# Patient Record
Sex: FEMALE | Race: WHITE | NOT HISPANIC OR LATINO | Employment: FULL TIME | URBAN - METROPOLITAN AREA
[De-identification: names, ages, dates, MRNs, and addresses within clinical notes are randomized per-mention and may not be internally consistent; named-entity substitution may affect disease eponyms.]

---

## 2017-01-04 ENCOUNTER — TRANSCRIBE ORDERS (OUTPATIENT)
Dept: ADMINISTRATIVE | Facility: HOSPITAL | Age: 52
End: 2017-01-04

## 2017-01-04 DIAGNOSIS — E04.1 THYROID NODULE: Primary | ICD-10-CM

## 2017-01-10 ENCOUNTER — HOSPITAL ENCOUNTER (OUTPATIENT)
Dept: ULTRASOUND IMAGING | Facility: CLINIC | Age: 52
Discharge: HOME | End: 2017-01-10
Payer: COMMERCIAL

## 2017-01-10 DIAGNOSIS — E04.1 THYROID NODULE: ICD-10-CM

## 2017-01-10 PROCEDURE — 76536 US EXAM OF HEAD AND NECK: CPT

## 2017-11-10 ENCOUNTER — TRANSCRIBE ORDERS (OUTPATIENT)
Dept: ADMINISTRATIVE | Facility: HOSPITAL | Age: 52
End: 2017-11-10

## 2017-11-10 ENCOUNTER — APPOINTMENT (OUTPATIENT)
Dept: RADIOLOGY | Facility: CLINIC | Age: 52
End: 2017-11-10
Payer: COMMERCIAL

## 2017-11-10 DIAGNOSIS — M25.512 PAIN IN JOINT OF LEFT SHOULDER: ICD-10-CM

## 2017-11-10 DIAGNOSIS — M25.512 PAIN IN JOINT OF LEFT SHOULDER: Primary | ICD-10-CM

## 2017-11-10 PROCEDURE — 73030 X-RAY EXAM OF SHOULDER: CPT

## 2018-07-09 ENCOUNTER — TELEPHONE (OUTPATIENT)
Dept: ENDOCRINOLOGY | Facility: HOSPITAL | Age: 53
End: 2018-07-09

## 2018-07-09 NOTE — TELEPHONE ENCOUNTER
Left message for patient  She had called requesting a refill of synthroid 200mcg, we need to clarify directions and pharmacy

## 2018-07-17 DIAGNOSIS — E03.9 HYPOTHYROIDISM, UNSPECIFIED TYPE: Primary | ICD-10-CM

## 2018-07-17 RX ORDER — LEVOTHYROXINE SODIUM 0.2 MG/1
200 TABLET ORAL DAILY
Qty: 31 TABLET | Refills: 5 | Status: SHIPPED | OUTPATIENT
Start: 2018-07-17 | End: 2018-12-21 | Stop reason: SDUPTHER

## 2018-07-17 NOTE — TELEPHONE ENCOUNTER
Patient would like a new lab slip to check her hypothyroidism  She said it has been over a year  pls call when slip is done

## 2018-08-11 LAB
T4 FREE SERPL-MCNC: 1.1 NG/DL (ref 0.8–1.8)
TSH SERPL-ACNC: 0.72 MIU/L

## 2018-12-21 ENCOUNTER — OFFICE VISIT (OUTPATIENT)
Dept: ENDOCRINOLOGY | Facility: HOSPITAL | Age: 53
End: 2018-12-21
Payer: COMMERCIAL

## 2018-12-21 VITALS
WEIGHT: 251.2 LBS | HEART RATE: 68 BPM | SYSTOLIC BLOOD PRESSURE: 114 MMHG | DIASTOLIC BLOOD PRESSURE: 82 MMHG | BODY MASS INDEX: 39.43 KG/M2 | HEIGHT: 67 IN

## 2018-12-21 DIAGNOSIS — E06.3 HYPOTHYROIDISM DUE TO HASHIMOTO'S THYROIDITIS: Primary | ICD-10-CM

## 2018-12-21 DIAGNOSIS — E03.8 HYPOTHYROIDISM DUE TO HASHIMOTO'S THYROIDITIS: Primary | ICD-10-CM

## 2018-12-21 DIAGNOSIS — E03.9 HYPOTHYROIDISM, UNSPECIFIED TYPE: ICD-10-CM

## 2018-12-21 PROCEDURE — 99204 OFFICE O/P NEW MOD 45 MIN: CPT | Performed by: INTERNAL MEDICINE

## 2018-12-21 RX ORDER — ATENOLOL 25 MG/1
25 TABLET ORAL DAILY
Refills: 3 | COMMUNITY
Start: 2018-10-17

## 2018-12-21 RX ORDER — PRAVASTATIN SODIUM 40 MG
40 TABLET ORAL DAILY
Refills: 0 | COMMUNITY
Start: 2018-12-12

## 2018-12-21 RX ORDER — VENLAFAXINE HYDROCHLORIDE 150 MG/1
150 CAPSULE, EXTENDED RELEASE ORAL DAILY
Refills: 0 | COMMUNITY
Start: 2018-11-21

## 2018-12-21 RX ORDER — LEVOTHYROXINE SODIUM 0.2 MG/1
200 TABLET ORAL DAILY
Qty: 30 TABLET | Refills: 7 | Status: SHIPPED | OUTPATIENT
Start: 2018-12-21 | End: 2019-08-23

## 2018-12-21 RX ORDER — HYDROCHLOROTHIAZIDE 25 MG/1
25 TABLET ORAL DAILY
Refills: 3 | COMMUNITY
Start: 2018-10-03

## 2018-12-21 RX ORDER — ERGOCALCIFEROL 1.25 MG/1
50000 CAPSULE ORAL WEEKLY
Refills: 3 | COMMUNITY
Start: 2018-10-16

## 2018-12-21 NOTE — LETTER
December 21, 2018     Nolberto Matias MD  [address] [place] [address]    Patient: Cindy Lawson   YOB: 1965   Date of Visit: 12/21/2018       Dear Dr Erika Fuller:    Thank you for referring Joi Cowart to me for evaluation  Below are my notes for this consultation  If you have questions, please do not hesitate to call me  I look forward to following your patient along with you  Sincerely,        Marlene Horta DO        CC: No Recipients  Marlene Horta DO  12/21/2018  2:17 PM  Sign at close encounter  12/21/2018    Assessment/Plan      Diagnoses and all orders for this visit:    Hypothyroidism due to Hashimoto's thyroiditis  -     TSH, 3rd generation; Future  -     T4, free Lab Collect; Future  -     US thyroid; Future    Hypothyroidism, unspecified type  -     levothyroxine (SYNTHROID) 200 mcg tablet; Take 1 tablet (200 mcg total) by mouth daily 1 tab daily  0 5 tabs 2 days of the month  BRAND NECESSARY  -     TSH, 3rd generation; Future  -     T4, free Lab Collect; Future  -     US thyroid; Future    Other orders  -     venlafaxine (EFFEXOR-XR) 150 mg 24 hr capsule; Take 150 mg by mouth daily  -     pravastatin (PRAVACHOL) 40 mg tablet; Take 40 mg by mouth daily  -     hydrochlorothiazide (HYDRODIURIL) 25 mg tablet; Take 25 mg by mouth daily  -     ergocalciferol (VITAMIN D2) 50,000 units; Take 50,000 Units by mouth once a week  -     atenolol (TENORMIN) 25 mg tablet; Take 25 mg by mouth daily        Assessment/Plan:  57-year-old female presents to establish care for hypothyroidism due to Hashimoto's thyroiditis  Clinically and biochemically she is doing well on current Synthroid brand 200 mcg tablet dose of 1 tablet daily except on the 1st and the 15th of the month when she takes only half tablet  I would suggest we continue this and see her back in August which will be 1 year from her previous blood work  She over TSH and free T4 before that appointment    I have asked her to have a repeat thyroid ultrasound done  In the past there was a possible nodular area though it was felt that this is more related to her Hashimoto's contour of the gland  We will touch base at her appointment regarding the results of the ultrasound  CC:   Hypothyroidism    History of Present Illness     HPI: Concetta Maloney is a 52y o  year old female with history of hypothyroidism due to Hashimoto's thyroiditis, hypertension, hyperlipidemia, vitamin-D deficiency who presents to [facility] care  Previously she followed doctor Sun  She presents today feeling well without any symptoms of concern  She denies any concerns regarding fatigue, temperature intolerance  She does report some dry skin but relates this is more to the time of the year  She denies any neck compressive symptoms  She denies any siblings or parents with thyroid problems  She does state her son has a history of a thyroid problem 2  She takes her Synthroid brand 200 mcg tablet dose appropriately  She takes 1 tablet daily except on the 1st in the 15th of the month she will take only half tablet  No other new health issues or symptoms of concern expressed today  Review of Systems   Constitutional: Negative for fatigue  HENT: Negative for trouble swallowing and voice change  Eyes: Negative for visual disturbance  Respiratory: Negative for shortness of breath  Cardiovascular: Negative for palpitations and leg swelling  Gastrointestinal: Negative for abdominal pain, nausea and vomiting  Endocrine: Negative for cold intolerance, heat intolerance, polydipsia and polyuria  Musculoskeletal: Negative for arthralgias and myalgias  Skin: Negative for rash  Neurological: Negative for dizziness, tremors and weakness  Hematological: Negative for adenopathy  Psychiatric/Behavioral: Negative for agitation and confusion  Historical Information   History reviewed   No pertinent past medical history  History reviewed  No pertinent surgical history  Social History   History   Alcohol use Not on file     History   Drug use: Unknown     History   Smoking Status    Never Smoker   Smokeless Tobacco    Never Used     Family History:   Family History   Problem Relation Age of Onset    Cancer Mother     No Known Problems Father     Diabetes unspecified Sister     Diabetes unspecified Brother     Diabetes unspecified Family        Meds/Allergies   Current Outpatient Prescriptions   Medication Sig Dispense Refill    atenolol (TENORMIN) 25 mg tablet Take 25 mg by mouth daily  3    ergocalciferol (VITAMIN D2) 50,000 units Take 50,000 Units by mouth once a week  3    hydrochlorothiazide (HYDRODIURIL) 25 mg tablet Take 25 mg by mouth daily  3    levothyroxine (SYNTHROID) 200 mcg tablet Take 1 tablet (200 mcg total) by mouth daily 1 tab daily  0 5 tabs 2 days of the month  BRAND NECESSARY  30 tablet 7    pravastatin (PRAVACHOL) 40 mg tablet Take 40 mg by mouth daily  0    venlafaxine (EFFEXOR-XR) 150 mg 24 hr capsule Take 150 mg by mouth daily  0     No current facility-administered medications for this visit  No Known Allergies    Objective   Vitals: Blood pressure 114/82, pulse 68, height 5' 7" (1 702 m), weight 114 kg (251 lb 3 2 oz)  Invasive Devices          No matching active lines, drains, or airways          Physical Exam   Constitutional: She is oriented to person, place, and time  She appears well-developed and well-nourished  No distress  HENT:   Head: Normocephalic and atraumatic  Eyes: Pupils are equal, round, and reactive to light  Conjunctivae are normal    Neck: Normal range of motion  Neck supple  No thyromegaly present  Cardiovascular: Normal rate and regular rhythm  Pulmonary/Chest: Effort normal and breath sounds normal  No respiratory distress  She has no wheezes  Abdominal: Soft  Bowel sounds are normal  She exhibits no distension     Musculoskeletal: Normal range of motion  She exhibits no edema  Lymphadenopathy:     She has no cervical adenopathy  Neurological: She is alert and oriented to person, place, and time  She exhibits normal muscle tone  Skin: Skin is warm and dry  No rash noted  She is not diaphoretic  Psychiatric: She has a normal mood and affect  Her behavior is normal    Vitals reviewed  The history was obtained from the review of the chart and from the patient  Lab Results:      Recent Results (from the past 03147 hour(s))   TSH+Free T4    Collection Time: 08/10/18  1:45 PM   Result Value Ref Range    TSH 0 72 mIU/L    Free t4 1 1 0 8 - 1 8 ng/dL     01/10/2017:  THYROID ULTRASOUND     INDICATION: Thyroid nodule      COMPARISON: Ultrasound 4/26/2011     TECHNIQUE:   Ultrasound of the thyroid was performed with a high frequency linear transducer in transverse and sagittal planes including volumetric imaging sweeps as well as traditional still imaging technique      FINDINGS:  Thyroid parenchyma is diffusely heterogeneous in echotexture      Right gland:  4 0 x 1 2 x 1 4 cm       1 4 x 0 6 x 0 5 cm hypoechoic ovoid nodular area seen in the anterior midpole without significant internal vascularity  Unclear if this represents heterogeneous parenchyma versus a true nodule      1 0 x 0 6 x 0 8 cm anterior lower pole hypoechoic nodular focus with some internal vascularity, may also represent heterogeneous parenchyma versus true nodule  This appears more discrete than the prior study however      Left gland:  4 4 x 1 8 x 1 2 cm  No dominant nodules      Isthmus: The isthmus is 0 2 cm in AP dimension      IMPRESSION:     Diffusely heterogeneous thyroid  Hypoechoic right-sided nodular foci may represent heterogeneous parenchyma versus true nodules  Six-month follow-up ultrasound recommended  No future appointments  Portions of the record may have been created with voice recognition software   Occasional wrong word or "sound a like" substitutions may have occurred due to the inherent limitations of voice recognition software  Read the chart carefully and recognize, using context, where substitutions have occurred

## 2018-12-21 NOTE — PROGRESS NOTES
12/21/2018    Assessment/Plan      Diagnoses and all orders for this visit:    Hypothyroidism due to Hashimoto's thyroiditis  -     TSH, 3rd generation; Future  -     T4, free Lab Collect; Future  -     US thyroid; Future    Hypothyroidism, unspecified type  -     levothyroxine (SYNTHROID) 200 mcg tablet; Take 1 tablet (200 mcg total) by mouth daily 1 tab daily  0 5 tabs 2 days of the month  BRAND NECESSARY  -     TSH, 3rd generation; Future  -     T4, free Lab Collect; Future  -     US thyroid; Future    Other orders  -     venlafaxine (EFFEXOR-XR) 150 mg 24 hr capsule; Take 150 mg by mouth daily  -     pravastatin (PRAVACHOL) 40 mg tablet; Take 40 mg by mouth daily  -     hydrochlorothiazide (HYDRODIURIL) 25 mg tablet; Take 25 mg by mouth daily  -     ergocalciferol (VITAMIN D2) 50,000 units; Take 50,000 Units by mouth once a week  -     atenolol (TENORMIN) 25 mg tablet; Take 25 mg by mouth daily        Assessment/Plan:  68-year-old female presents to establish care for hypothyroidism due to Hashimoto's thyroiditis  Clinically and biochemically she is doing well on current Synthroid brand 200 mcg tablet dose of 1 tablet daily except on the 1st and the 15th of the month when she takes only half tablet  I would suggest we continue this and see her back in August which will be 1 year from her previous blood work  She over TSH and free T4 before that appointment  I have asked her to have a repeat thyroid ultrasound done  In the past there was a possible nodular area though it was felt that this is more related to her Hashimoto's contour of the gland  We will touch base at her appointment regarding the results of the ultrasound  CC:   Hypothyroidism    History of Present Illness     HPI: Bert Alvarez is a 52y o  year old female with history of hypothyroidism due to Hashimoto's thyroiditis, hypertension, hyperlipidemia, vitamin-D deficiency who presents to establish care    Previously she followed doctor Sun  She presents today feeling well without any symptoms of concern  She denies any concerns regarding fatigue, temperature intolerance  She does report some dry skin but relates this is more to the time of the year  She denies any neck compressive symptoms  She denies any siblings or parents with thyroid problems  She does state her son has a history of a thyroid problem 2  She takes her Synthroid brand 200 mcg tablet dose appropriately  She takes 1 tablet daily except on the 1st in the 15th of the month she will take only half tablet  No other new health issues or symptoms of concern expressed today  Review of Systems   Constitutional: Negative for fatigue  HENT: Negative for trouble swallowing and voice change  Eyes: Negative for visual disturbance  Respiratory: Negative for shortness of breath  Cardiovascular: Negative for palpitations and leg swelling  Gastrointestinal: Negative for abdominal pain, nausea and vomiting  Endocrine: Negative for cold intolerance, heat intolerance, polydipsia and polyuria  Musculoskeletal: Negative for arthralgias and myalgias  Skin: Negative for rash  Neurological: Negative for dizziness, tremors and weakness  Hematological: Negative for adenopathy  Psychiatric/Behavioral: Negative for agitation and confusion  Historical Information   History reviewed  No pertinent past medical history  History reviewed  No pertinent surgical history    Social History   History   Alcohol use Not on file     History   Drug use: Unknown     History   Smoking Status    Never Smoker   Smokeless Tobacco    Never Used     Family History:   Family History   Problem Relation Age of Onset    Cancer Mother     No Known Problems Father     Diabetes unspecified Sister     Diabetes unspecified Brother     Diabetes unspecified Family        Meds/Allergies   Current Outpatient Prescriptions   Medication Sig Dispense Refill    atenolol (TENORMIN) 25 mg tablet Take 25 mg by mouth daily  3    ergocalciferol (VITAMIN D2) 50,000 units Take 50,000 Units by mouth once a week  3    hydrochlorothiazide (HYDRODIURIL) 25 mg tablet Take 25 mg by mouth daily  3    levothyroxine (SYNTHROID) 200 mcg tablet Take 1 tablet (200 mcg total) by mouth daily 1 tab daily  0 5 tabs 2 days of the month  BRAND NECESSARY  30 tablet 7    pravastatin (PRAVACHOL) 40 mg tablet Take 40 mg by mouth daily  0    venlafaxine (EFFEXOR-XR) 150 mg 24 hr capsule Take 150 mg by mouth daily  0     No current facility-administered medications for this visit  No Known Allergies    Objective   Vitals: Blood pressure 114/82, pulse 68, height 5' 7" (1 702 m), weight 114 kg (251 lb 3 2 oz)  Invasive Devices          No matching active lines, drains, or airways          Physical Exam   Constitutional: She is oriented to person, place, and time  She appears well-developed and well-nourished  No distress  HENT:   Head: Normocephalic and atraumatic  Eyes: Pupils are equal, round, and reactive to light  Conjunctivae are normal    Neck: Normal range of motion  Neck supple  No thyromegaly present  Cardiovascular: Normal rate and regular rhythm  Pulmonary/Chest: Effort normal and breath sounds normal  No respiratory distress  She has no wheezes  Abdominal: Soft  Bowel sounds are normal  She exhibits no distension  Musculoskeletal: Normal range of motion  She exhibits no edema  Lymphadenopathy:     She has no cervical adenopathy  Neurological: She is alert and oriented to person, place, and time  She exhibits normal muscle tone  Skin: Skin is warm and dry  No rash noted  She is not diaphoretic  Psychiatric: She has a normal mood and affect  Her behavior is normal    Vitals reviewed  The history was obtained from the review of the chart and from the patient      Lab Results:      Recent Results (from the past 09354 hour(s))   TSH+Free T4    Collection Time: 08/10/18  1:45 PM   Result Value Ref Range    TSH 0 72 mIU/L    Free t4 1 1 0 8 - 1 8 ng/dL     01/10/2017:  THYROID ULTRASOUND     INDICATION: Thyroid nodule      COMPARISON: Ultrasound 4/26/2011     TECHNIQUE:   Ultrasound of the thyroid was performed with a high frequency linear transducer in transverse and sagittal planes including volumetric imaging sweeps as well as traditional still imaging technique      FINDINGS:  Thyroid parenchyma is diffusely heterogeneous in echotexture      Right gland:  4 0 x 1 2 x 1 4 cm       1 4 x 0 6 x 0 5 cm hypoechoic ovoid nodular area seen in the anterior midpole without significant internal vascularity  Unclear if this represents heterogeneous parenchyma versus a true nodule      1 0 x 0 6 x 0 8 cm anterior lower pole hypoechoic nodular focus with some internal vascularity, may also represent heterogeneous parenchyma versus true nodule  This appears more discrete than the prior study however      Left gland:  4 4 x 1 8 x 1 2 cm  No dominant nodules      Isthmus: The isthmus is 0 2 cm in AP dimension      IMPRESSION:     Diffusely heterogeneous thyroid  Hypoechoic right-sided nodular foci may represent heterogeneous parenchyma versus true nodules  Six-month follow-up ultrasound recommended  No future appointments  Portions of the record may have been created with voice recognition software  Occasional wrong word or "sound a like" substitutions may have occurred due to the inherent limitations of voice recognition software  Read the chart carefully and recognize, using context, where substitutions have occurred

## 2019-01-14 ENCOUNTER — APPOINTMENT (OUTPATIENT)
Dept: RADIOLOGY | Facility: CLINIC | Age: 54
End: 2019-01-14
Payer: COMMERCIAL

## 2019-01-14 ENCOUNTER — TRANSCRIBE ORDERS (OUTPATIENT)
Dept: ADMINISTRATIVE | Facility: HOSPITAL | Age: 54
End: 2019-01-14

## 2019-01-14 DIAGNOSIS — M54.16 LUMBAR RADICULOPATHY: Primary | ICD-10-CM

## 2019-01-14 DIAGNOSIS — M54.16 LUMBAR RADICULOPATHY: ICD-10-CM

## 2019-01-14 PROCEDURE — 72110 X-RAY EXAM L-2 SPINE 4/>VWS: CPT

## 2019-08-15 LAB
T4 FREE SERPL-MCNC: 1.7 NG/DL (ref 0.8–1.8)
TSH SERPL-ACNC: 0.06 MIU/L

## 2019-08-22 ENCOUNTER — HOSPITAL ENCOUNTER (OUTPATIENT)
Dept: ULTRASOUND IMAGING | Facility: CLINIC | Age: 54
Discharge: HOME | End: 2019-08-22
Payer: COMMERCIAL

## 2019-08-22 DIAGNOSIS — E06.3 HYPOTHYROIDISM DUE TO HASHIMOTO'S THYROIDITIS: ICD-10-CM

## 2019-08-22 DIAGNOSIS — E03.8 HYPOTHYROIDISM DUE TO HASHIMOTO'S THYROIDITIS: ICD-10-CM

## 2019-08-22 DIAGNOSIS — E03.9 HYPOTHYROIDISM, UNSPECIFIED TYPE: ICD-10-CM

## 2019-08-22 PROCEDURE — 76536 US EXAM OF HEAD AND NECK: CPT

## 2019-08-23 ENCOUNTER — OFFICE VISIT (OUTPATIENT)
Dept: ENDOCRINOLOGY | Facility: HOSPITAL | Age: 54
End: 2019-08-23
Payer: COMMERCIAL

## 2019-08-23 VITALS
BODY MASS INDEX: 35.66 KG/M2 | HEART RATE: 60 BPM | HEIGHT: 67 IN | WEIGHT: 227.2 LBS | SYSTOLIC BLOOD PRESSURE: 110 MMHG | DIASTOLIC BLOOD PRESSURE: 68 MMHG

## 2019-08-23 DIAGNOSIS — E03.8 HYPOTHYROIDISM DUE TO HASHIMOTO'S THYROIDITIS: Primary | ICD-10-CM

## 2019-08-23 DIAGNOSIS — E06.3 HYPOTHYROIDISM DUE TO HASHIMOTO'S THYROIDITIS: Primary | ICD-10-CM

## 2019-08-23 PROCEDURE — 99214 OFFICE O/P EST MOD 30 MIN: CPT | Performed by: INTERNAL MEDICINE

## 2019-08-23 RX ORDER — LEVOTHYROXINE SODIUM 0.1 MG/1
100 TABLET ORAL DAILY
COMMUNITY
End: 2019-10-08 | Stop reason: SDUPTHER

## 2019-08-23 RX ORDER — MULTIVITAMIN
1 CAPSULE ORAL DAILY
COMMUNITY

## 2019-08-23 RX ORDER — LEVOTHYROXINE SODIUM 88 UG/1
TABLET ORAL
Start: 2019-08-23 | End: 2019-11-19 | Stop reason: SDUPTHER

## 2019-08-23 RX ORDER — LEVOTHYROXINE SODIUM 88 UG/1
88 TABLET ORAL DAILY
COMMUNITY
End: 2019-08-23 | Stop reason: SDUPTHER

## 2019-08-23 NOTE — PROGRESS NOTES
8/23/2019    Assessment/Plan      Diagnoses and all orders for this visit:    Hypothyroidism due to Hashimoto's thyroiditis    Other orders  -     levothyroxine 100 mcg tablet; Take 100 mcg by mouth daily  -     levothyroxine 88 mcg tablet; Take 88 mcg by mouth daily  -     Calcium Carbonate-Vit D-Min (CALCIUM 1200 PO); Take by mouth  -     Multiple Vitamin (MULTIVITAMIN) capsule; Take 1 capsule by mouth daily        Assessment/Plan:  Hypothyroidism due to Hashimoto's thyroiditis:  I have suggested we decrease her levothyroxine dose to 188 mcg 6 days a week and only 100 mcg on Sundays  Repeat TSH and free T4 in about 6 weeks  We will call with the results  Discussed with her that the nodular area on ultrasound is likely related to background Hashimoto's and not a true nodule  Follow-up in the office in 6 months  CC: Follow-up    History of Present Illness     HPI: Ariel Hatch is a 52y o  year old female with history of hypothyroidism due to Hashimoto's thyroiditis, hypertension, hyperlipidemia, vitamin-D deficiency presents for follow-up appointment  She is maintained on a total of 188 mcg of levothyroxine daily  She was evaluated by bariatric medicine at [facility] and day reduce her dose to a total of 188 mcg daily  She has lost about 30 lb on her own  Overall she is feeling well  Denies any symptoms of hyperthyroidism or hypothyroidism  No neck compressive symptoms  Review of Systems   Constitutional: Negative for fatigue  HENT: Negative for trouble swallowing and voice change  Eyes: Negative for visual disturbance  Respiratory: Negative for shortness of breath  Cardiovascular: Negative for palpitations and leg swelling  Gastrointestinal: Negative for abdominal pain, nausea and vomiting  Endocrine: Negative for polydipsia and polyuria  Musculoskeletal: Negative for arthralgias and myalgias  Skin: Negative for rash     Neurological: Negative for dizziness, tremors and weakness  Hematological: Negative for adenopathy  Psychiatric/Behavioral: Negative for agitation and confusion  Historical Information   History reviewed  No pertinent past medical history  History reviewed  No pertinent surgical history  Social History   Social History     Substance and Sexual Activity   Alcohol Use Not on file     Social History     Substance and Sexual Activity   Drug Use Not on file     Social History     Tobacco Use   Smoking Status Never Smoker   Smokeless Tobacco Never Used     Family History:   Family History   Problem Relation Age of Onset    Cancer Mother     No Known Problems Father     Diabetes unspecified Sister     Diabetes unspecified Brother     Diabetes unspecified Family        Meds/Allergies   Current Outpatient Medications   Medication Sig Dispense Refill    atenolol (TENORMIN) 25 mg tablet Take 25 mg by mouth daily  3    Calcium Carbonate-Vit D-Min (CALCIUM 1200 PO) Take by mouth      ergocalciferol (VITAMIN D2) 50,000 units Take 50,000 Units by mouth once a week  3    hydrochlorothiazide (HYDRODIURIL) 25 mg tablet Take 25 mg by mouth daily  3    levothyroxine 100 mcg tablet Take 100 mcg by mouth daily      levothyroxine 88 mcg tablet Take 88 mcg by mouth daily      Multiple Vitamin (MULTIVITAMIN) capsule Take 1 capsule by mouth daily      pravastatin (PRAVACHOL) 40 mg tablet Take 40 mg by mouth daily  0    levothyroxine (SYNTHROID) 200 mcg tablet Take 1 tablet (200 mcg total) by mouth daily 1 tab daily  0 5 tabs 2 days of the month  BRAND NECESSARY  (Patient not taking: Reported on 8/23/2019) 30 tablet 7    venlafaxine (EFFEXOR-XR) 150 mg 24 hr capsule Take 150 mg by mouth daily  0     No current facility-administered medications for this visit  No Known Allergies    Objective   Vitals: Blood pressure 110/68, pulse 60, height 5' 7" (1 702 m), weight 103 kg (227 lb 3 2 oz)    Invasive Devices     None                 Physical Exam Constitutional: She is oriented to person, place, and time  She appears well-developed and well-nourished  No distress  HENT:   Head: Normocephalic and atraumatic  Neck: Normal range of motion  Neck supple  No thyromegaly present  Cardiovascular: Normal rate and regular rhythm  Pulmonary/Chest: Effort normal and breath sounds normal  No respiratory distress  Abdominal: Soft  Bowel sounds are normal    Musculoskeletal: Normal range of motion  She exhibits no edema  Neurological: She is alert and oriented to person, place, and time  She exhibits normal muscle tone  Skin: Skin is warm and dry  No rash noted  She is not diaphoretic  Psychiatric: She has a normal mood and affect  Her behavior is normal    Vitals reviewed  The history was obtained from the review of the chart and from the patient  Lab Results:      Recent Results (from the past 95037 hour(s))   TSH+Free T4    Collection Time: 08/10/18  1:45 PM   Result Value Ref Range    TSH 0 72 mIU/L    Free t4 1 1 0 8 - 1 8 ng/dL   TSH+Free T4    Collection Time: 08/14/19  2:20 PM   Result Value Ref Range    TSH 0 06 (L) mIU/L    Free t4 1 7 0 8 - 1 8 ng/dL     8/22/19:  THYROID ULTRASOUND     INDICATION:    E03 8: Other specified hypothyroidism  E06 3: Autoimmune thyroiditis  E03 9: Hypothyroidism, unspecified  Hashimoto's thyroiditis        COMPARISON:  Thyroid ultrasound on January 10, 2017 and April 26, 2011      TECHNIQUE:   Ultrasound of the thyroid was performed with a high frequency linear transducer in transverse and sagittal planes including volumetric imaging sweeps as well as traditional still imaging technique      FINDINGS:  Thyroid parenchyma is diffusely heterogeneous in echotexture      Right lobe:  3 3 x 1 1 x 1 0 cm  Total volume: 1 8  Left lobe:  2 6 x 0 8 x 0 7 cm    Total volume: 0 7  Isthmus:  0 1 cm      On image #9 there was a right lower pole nodule measured, however, this is felt to be heterogeneous thyroid tissue as opposed to a true thyroid nodule      IMPRESSION:     Diffusely heterogeneous and atrophic thyroid gland consistent with prior history of Hashimoto's thyroiditis      On image #9 there was a right lower pole nodule measured, however, this is felt to be heterogeneous thyroid tissue as opposed to a true thyroid nodule  Even if this was a true thyroid nodule it would not meet current ACR criteria requiring biopsy or   follow-up ultrasounds        No future appointments  Portions of the record may have been created with voice recognition software  Occasional wrong word or "sound a like" substitutions may have occurred due to the inherent limitations of voice recognition software  Read the chart carefully and recognize, using context, where substitutions have occurred

## 2019-10-08 DIAGNOSIS — E03.8 HYPOTHYROIDISM DUE TO HASHIMOTO'S THYROIDITIS: Primary | ICD-10-CM

## 2019-10-08 DIAGNOSIS — E06.3 HYPOTHYROIDISM DUE TO HASHIMOTO'S THYROIDITIS: Primary | ICD-10-CM

## 2019-10-08 LAB
T4 FREE SERPL-MCNC: 1.6 NG/DL (ref 0.8–1.8)
TSH SERPL-ACNC: 0.02 MIU/L (ref 0.4–4.5)

## 2019-10-08 RX ORDER — LEVOTHYROXINE SODIUM 0.1 MG/1
100 TABLET ORAL DAILY
Refills: 0
Start: 2019-10-08 | End: 2019-12-31 | Stop reason: SDUPTHER

## 2019-11-19 ENCOUNTER — TELEPHONE (OUTPATIENT)
Dept: ENDOCRINOLOGY | Facility: HOSPITAL | Age: 54
End: 2019-11-19

## 2019-11-19 DIAGNOSIS — E03.8 HYPOTHYROIDISM DUE TO HASHIMOTO'S THYROIDITIS: ICD-10-CM

## 2019-11-19 DIAGNOSIS — E06.3 HYPOTHYROIDISM DUE TO HASHIMOTO'S THYROIDITIS: ICD-10-CM

## 2019-11-19 LAB
T4 FREE SERPL-MCNC: 1.2 NG/DL (ref 0.8–1.8)
TSH SERPL-ACNC: 0.09 MIU/L (ref 0.4–4.5)

## 2019-11-19 RX ORDER — LEVOTHYROXINE SODIUM 88 UG/1
TABLET ORAL
Start: 2019-11-19 | End: 2019-12-31 | Stop reason: SDUPTHER

## 2019-12-23 DIAGNOSIS — E06.3 HYPOTHYROIDISM DUE TO HASHIMOTO'S THYROIDITIS: Primary | ICD-10-CM

## 2019-12-23 DIAGNOSIS — E03.8 HYPOTHYROIDISM DUE TO HASHIMOTO'S THYROIDITIS: Primary | ICD-10-CM

## 2019-12-23 RX ORDER — LEVOTHYROXINE SODIUM 0.2 MG/1
200 TABLET ORAL DAILY
Qty: 30 TABLET | Refills: 0 | Status: SHIPPED | OUTPATIENT
Start: 2019-12-23 | End: 2019-12-31

## 2019-12-28 LAB
T4 FREE SERPL-MCNC: 1.2 NG/DL (ref 0.8–1.8)
TSH SERPL-ACNC: 0.13 MIU/L (ref 0.4–4.5)

## 2019-12-31 DIAGNOSIS — E03.8 HYPOTHYROIDISM DUE TO HASHIMOTO'S THYROIDITIS: ICD-10-CM

## 2019-12-31 DIAGNOSIS — E06.3 HYPOTHYROIDISM DUE TO HASHIMOTO'S THYROIDITIS: Primary | ICD-10-CM

## 2019-12-31 DIAGNOSIS — E03.8 HYPOTHYROIDISM DUE TO HASHIMOTO'S THYROIDITIS: Primary | ICD-10-CM

## 2019-12-31 DIAGNOSIS — E06.3 HYPOTHYROIDISM DUE TO HASHIMOTO'S THYROIDITIS: ICD-10-CM

## 2019-12-31 RX ORDER — LEVOTHYROXINE SODIUM 0.1 MG/1
100 TABLET ORAL DAILY
Qty: 90 TABLET | Refills: 3 | Status: SHIPPED | OUTPATIENT
Start: 2019-12-31 | End: 2020-12-28 | Stop reason: SDUPTHER

## 2019-12-31 RX ORDER — LEVOTHYROXINE SODIUM 88 UG/1
TABLET ORAL
Refills: 0
Start: 2019-12-31 | End: 2019-12-31 | Stop reason: SDUPTHER

## 2019-12-31 RX ORDER — LEVOTHYROXINE SODIUM 88 UG/1
TABLET ORAL
Qty: 90 TABLET | Refills: 3 | Status: SHIPPED | OUTPATIENT
Start: 2019-12-31 | End: 2021-02-19 | Stop reason: SDUPTHER

## 2019-12-31 NOTE — TELEPHONE ENCOUNTER
Pt needs her Levothyroxine 100mcg sent to the pharmacy please  She also needs the 88mcg sent with the new dose

## 2020-02-22 LAB
T4 FREE SERPL-MCNC: 1.1 NG/DL (ref 0.8–1.8)
TSH SERPL-ACNC: 0.49 MIU/L (ref 0.4–4.5)

## 2020-02-28 ENCOUNTER — OFFICE VISIT (OUTPATIENT)
Dept: ENDOCRINOLOGY | Facility: HOSPITAL | Age: 55
End: 2020-02-28
Payer: COMMERCIAL

## 2020-02-28 VITALS
SYSTOLIC BLOOD PRESSURE: 104 MMHG | HEIGHT: 67 IN | HEART RATE: 76 BPM | BODY MASS INDEX: 34.75 KG/M2 | WEIGHT: 221.4 LBS | DIASTOLIC BLOOD PRESSURE: 68 MMHG

## 2020-02-28 DIAGNOSIS — E03.8 HYPOTHYROIDISM DUE TO HASHIMOTO'S THYROIDITIS: Primary | ICD-10-CM

## 2020-02-28 DIAGNOSIS — E55.9 VITAMIN D DEFICIENCY: ICD-10-CM

## 2020-02-28 DIAGNOSIS — E06.3 HYPOTHYROIDISM DUE TO HASHIMOTO'S THYROIDITIS: Primary | ICD-10-CM

## 2020-02-28 PROCEDURE — 99213 OFFICE O/P EST LOW 20 MIN: CPT | Performed by: INTERNAL MEDICINE

## 2020-02-28 NOTE — PROGRESS NOTES
2/28/2020    Assessment/Plan      Diagnoses and all orders for this visit:    Hypothyroidism due to Hashimoto's thyroiditis  -     TSH, 3rd generation; Future  -     T4, free; Future    Vitamin D deficiency  -     Comprehensive metabolic panel Lab Collect; Future  -     Vitamin D 25 hydroxy Lab Collect; Future        Assessment/Plan:  1  Hypothyroidism:  Continue current regimen for now  Will continue to monitor over time  We may need to adjust her dose further if further weight loss occurs  Follow-up in 6 months with labs as ordered above just prior  2  Vitamin-D deficiency:  Check labs as ordered above prior to next appointment  CC:  Follow-up    History of Present Illness     HPI: Breana Barrera is a 62y o  year old female with history of hypothyroidism due to Hashimoto's thyroiditis, hypertension, hyperlipidemia, vitamin-D deficiency presents for follow-up appointment  She is maintained on levothyroxine 188 mcg total 4 days of the week 88 mcg the other 3 days of the week  Her dose was reduced over time given positive lifestyle measures and weight loss associated with this  She presents today overall feeling well  Denies any symptoms referable to hyperthyroidism or hypothyroidism  She continues on vitamin-D supplementation  Review of Systems   Constitutional: Negative for fatigue  HENT: Negative for trouble swallowing and voice change  Eyes: Negative for visual disturbance  Respiratory: Negative for shortness of breath  Cardiovascular: Negative for palpitations and leg swelling  Gastrointestinal: Negative for abdominal pain, nausea and vomiting  Endocrine: Negative for polydipsia and polyuria  Musculoskeletal: Negative for arthralgias and myalgias  Skin: Negative for rash  Neurological: Negative for dizziness, tremors and weakness  Hematological: Negative for adenopathy  Psychiatric/Behavioral: Negative for agitation and confusion         Historical Information   History reviewed  No pertinent past medical history  History reviewed  No pertinent surgical history  Social History   Social History     Substance and Sexual Activity   Alcohol Use Not on file     Social History     Substance and Sexual Activity   Drug Use Not on file     Social History     Tobacco Use   Smoking Status Never Smoker   Smokeless Tobacco Never Used     Family History:   Family History   Problem Relation Age of Onset    Cancer Mother     No Known Problems Father     Diabetes unspecified Sister     Diabetes unspecified Brother     Diabetes unspecified Family        Meds/Allergies   Current Outpatient Medications   Medication Sig Dispense Refill    atenolol (TENORMIN) 25 mg tablet Take 25 mg by mouth daily  3    Calcium Carbonate-Vit D-Min (CALCIUM 1200 PO) Take by mouth      ergocalciferol (VITAMIN D2) 50,000 units Take 50,000 Units by mouth once a week  3    hydrochlorothiazide (HYDRODIURIL) 25 mg tablet Take 25 mg by mouth daily  3    levothyroxine 100 mcg tablet Take 1 tablet (100 mcg total) by mouth daily 1 tab 6 days of the week  No tab sundays  (Patient taking differently: Take 100 mcg by mouth daily ) 90 tablet 3    levothyroxine 88 mcg tablet 1 tab 4 days of the week  No tab 3 days of the week  90 tablet 3    Multiple Vitamin (MULTIVITAMIN) capsule Take 1 capsule by mouth daily      pravastatin (PRAVACHOL) 40 mg tablet Take 40 mg by mouth daily  0    venlafaxine (EFFEXOR-XR) 150 mg 24 hr capsule Take 150 mg by mouth daily  0     No current facility-administered medications for this visit  No Known Allergies    Objective   Vitals: Blood pressure 104/68, pulse 76, height 5' 7" (1 702 m), weight 100 kg (221 lb 6 4 oz)  Invasive Devices     None                 Physical Exam   Constitutional: She is oriented to person, place, and time  She appears well-developed and well-nourished  No distress  HENT:   Head: Normocephalic and atraumatic  Neck: Normal range of motion  Neck supple  No thyromegaly present  Cardiovascular: Normal rate and regular rhythm  Pulmonary/Chest: Effort normal and breath sounds normal  No respiratory distress  Abdominal: Soft  Bowel sounds are normal    Musculoskeletal: Normal range of motion  She exhibits no edema  Neurological: She is alert and oriented to person, place, and time  She exhibits normal muscle tone  Skin: Skin is warm and dry  No rash noted  She is not diaphoretic  Psychiatric: She has a normal mood and affect  Her behavior is normal    Vitals reviewed  The history was obtained from the review of the chart and from the patient  Lab Results:      Recent Results (from the past 88762 hour(s))   TSH+Free T4    Collection Time: 08/14/19  2:20 PM   Result Value Ref Range    TSH 0 06 (L) mIU/L    Free t4 1 7 0 8 - 1 8 ng/dL   TSH+Free T4    Collection Time: 10/07/19  3:18 PM   Result Value Ref Range    TSH 0 02 (L) 0 40 - 4 50 mIU/L    Free t4 1 6 0 8 - 1 8 ng/dL   TSH+Free T4    Collection Time: 11/18/19  1:52 PM   Result Value Ref Range    TSH 0 09 (L) 0 40 - 4 50 mIU/L    Free t4 1 2 0 8 - 1 8 ng/dL   TSH+Free T4    Collection Time: 12/27/19  1:21 PM   Result Value Ref Range    TSH 0 13 (L) 0 40 - 4 50 mIU/L    Free t4 1 2 0 8 - 1 8 ng/dL   TSH+Free T4    Collection Time: 02/21/20 12:03 PM   Result Value Ref Range    TSH 0 49 0 40 - 4 50 mIU/L    Free t4 1 1 0 8 - 1 8 ng/dL         No future appointments  Portions of the record may have been created with voice recognition software  Occasional wrong word or "sound a like" substitutions may have occurred due to the inherent limitations of voice recognition software  Read the chart carefully and recognize, using context, where substitutions have occurred

## 2020-09-10 LAB
25(OH)D3 SERPL-MCNC: 58 NG/ML (ref 30–100)
ALBUMIN SERPL-MCNC: 4.1 G/DL (ref 3.6–5.1)
ALBUMIN/GLOB SERPL: 1.9 (CALC) (ref 1–2.5)
ALP SERPL-CCNC: 67 U/L (ref 37–153)
ALT SERPL-CCNC: 20 U/L (ref 6–29)
AST SERPL-CCNC: 21 U/L (ref 10–35)
BILIRUB SERPL-MCNC: 0.5 MG/DL (ref 0.2–1.2)
BUN SERPL-MCNC: 19 MG/DL (ref 7–25)
BUN/CREAT SERPL: NORMAL (CALC) (ref 6–22)
CALCIUM SERPL-MCNC: 9.6 MG/DL (ref 8.6–10.4)
CHLORIDE SERPL-SCNC: 109 MMOL/L (ref 98–110)
CO2 SERPL-SCNC: 26 MMOL/L (ref 20–32)
CREAT SERPL-MCNC: 0.69 MG/DL (ref 0.5–1.05)
GLOBULIN SER CALC-MCNC: 2.2 G/DL (CALC) (ref 1.9–3.7)
GLUCOSE SERPL-MCNC: 88 MG/DL (ref 65–139)
POTASSIUM SERPL-SCNC: 4.5 MMOL/L (ref 3.5–5.3)
PROT SERPL-MCNC: 6.3 G/DL (ref 6.1–8.1)
SL AMB EGFR AFRICAN AMERICAN: 112 ML/MIN/1.73M2
SL AMB EGFR NON AFRICAN AMERICAN: 97 ML/MIN/1.73M2
SODIUM SERPL-SCNC: 143 MMOL/L (ref 135–146)
T4 FREE SERPL-MCNC: 1.2 NG/DL (ref 0.8–1.8)
TSH SERPL-ACNC: 0.76 MIU/L (ref 0.4–4.5)

## 2020-09-14 ENCOUNTER — TELEPHONE (OUTPATIENT)
Dept: ENDOCRINOLOGY | Facility: HOSPITAL | Age: 55
End: 2020-09-14

## 2020-09-14 ENCOUNTER — OFFICE VISIT (OUTPATIENT)
Dept: ENDOCRINOLOGY | Facility: HOSPITAL | Age: 55
End: 2020-09-14
Payer: COMMERCIAL

## 2020-09-14 VITALS
HEIGHT: 67 IN | BODY MASS INDEX: 38.67 KG/M2 | HEART RATE: 72 BPM | DIASTOLIC BLOOD PRESSURE: 68 MMHG | SYSTOLIC BLOOD PRESSURE: 108 MMHG | WEIGHT: 246.4 LBS | TEMPERATURE: 97.3 F

## 2020-09-14 DIAGNOSIS — E55.9 VITAMIN D DEFICIENCY: ICD-10-CM

## 2020-09-14 DIAGNOSIS — E03.8 HYPOTHYROIDISM DUE TO HASHIMOTO'S THYROIDITIS: Primary | ICD-10-CM

## 2020-09-14 DIAGNOSIS — E06.3 HYPOTHYROIDISM DUE TO HASHIMOTO'S THYROIDITIS: Primary | ICD-10-CM

## 2020-09-14 PROCEDURE — 99213 OFFICE O/P EST LOW 20 MIN: CPT | Performed by: INTERNAL MEDICINE

## 2020-09-14 NOTE — PROGRESS NOTES
9/14/2020    Assessment/Plan      Diagnoses and all orders for this visit:    Hypothyroidism due to Hashimoto's thyroiditis  -     TSH, 3rd generation; Future  -     T4, free; Future  -     Comprehensive metabolic panel; Future  -     PTH, intact; Future  -     Vitamin D 25 hydroxy; Future  -     TSH, 3rd generation; Future  -     T4, free; Future    Vitamin D deficiency  -     Comprehensive metabolic panel; Future  -     PTH, intact; Future  -     Vitamin D 25 hydroxy; Future  -     TSH, 3rd generation; Future  -     T4, free; Future        Assessment/Plan:  1  Hypothyroidism:  Clinically and biochemically doing well on current regimen  We will continue current regimen repeat thyroid labs in 6 months and call her with the results  Follow-up in 1 year with another set of labs just prior  2  Vitamin-D deficiency:  Well supplemented on current regimen  Repeat labs in 1 year  CC: Follow-up    History of Present Illness     HPI: Jason Kimble is a 60y o  year old female with history of hypothyroidism due to Hashimoto's thyroiditis as well as vitamin-D deficiency who presents for a follow-up appointment  She continues on levothyroxine 188 mcg total 4 days a week and 88 mcg the other 3 days of the week  Over time, her dose has been reduced due to positive lifestyle measures and weight loss associated with this  She presents today overall feeling well  No new health issues or symptoms of concern  Review of Systems   Constitutional: Negative for fatigue  HENT: Negative for trouble swallowing and voice change  Eyes: Negative for visual disturbance  Respiratory: Negative for shortness of breath  Cardiovascular: Negative for palpitations and leg swelling  Gastrointestinal: Negative for abdominal pain, nausea and vomiting  Endocrine: Negative for polydipsia and polyuria  Musculoskeletal: Negative for arthralgias and myalgias  Skin: Negative for rash     Neurological: Negative for dizziness, tremors and weakness  Hematological: Negative for adenopathy  Psychiatric/Behavioral: Negative for agitation and confusion  Historical Information   History reviewed  No pertinent past medical history  History reviewed  No pertinent surgical history  Social History   Social History     Substance and Sexual Activity   Alcohol Use None     Social History     Substance and Sexual Activity   Drug Use Not on file     Social History     Tobacco Use   Smoking Status Never Smoker   Smokeless Tobacco Never Used     Family History:   Family History   Problem Relation Age of Onset    Cancer Mother     No Known Problems Father     Diabetes unspecified Sister     Diabetes unspecified Brother     Diabetes unspecified Family        Meds/Allergies   Current Outpatient Medications   Medication Sig Dispense Refill    atenolol (TENORMIN) 25 mg tablet Take 25 mg by mouth daily  3    Calcium Carbonate-Vit D-Min (CALCIUM 1200 PO) Take by mouth      ergocalciferol (VITAMIN D2) 50,000 units Take 50,000 Units by mouth once a week  3    hydrochlorothiazide (HYDRODIURIL) 25 mg tablet Take 25 mg by mouth daily  3    levothyroxine 100 mcg tablet Take 1 tablet (100 mcg total) by mouth daily 1 tab 6 days of the week  No tab sundays  (Patient taking differently: Take 100 mcg by mouth daily ) 90 tablet 3    levothyroxine 88 mcg tablet 1 tab 4 days of the week  No tab 3 days of the week  90 tablet 3    Multiple Vitamin (MULTIVITAMIN) capsule Take 1 capsule by mouth daily      pravastatin (PRAVACHOL) 40 mg tablet Take 40 mg by mouth daily  0    venlafaxine (EFFEXOR-XR) 150 mg 24 hr capsule Take 150 mg by mouth daily  0     No current facility-administered medications for this visit  No Known Allergies    Objective   Vitals: Blood pressure 108/68, pulse 72, temperature (!) 97 3 °F (36 3 °C), height 5' 7" (1 702 m), weight 112 kg (246 lb 6 4 oz)    Invasive Devices     None                 Physical Exam  Vitals signs reviewed  Constitutional:       General: She is not in acute distress  Appearance: She is well-developed  She is not diaphoretic  HENT:      Head: Normocephalic and atraumatic  Eyes:      Conjunctiva/sclera: Conjunctivae normal       Pupils: Pupils are equal, round, and reactive to light  Neck:      Musculoskeletal: Normal range of motion and neck supple  Thyroid: No thyromegaly  Cardiovascular:      Rate and Rhythm: Normal rate and regular rhythm  Pulmonary:      Effort: Pulmonary effort is normal  No respiratory distress  Breath sounds: Normal breath sounds  Abdominal:      General: Bowel sounds are normal       Palpations: Abdomen is soft  Musculoskeletal: Normal range of motion  Skin:     General: Skin is warm and dry  Findings: No rash  Neurological:      Mental Status: She is alert and oriented to person, place, and time  Motor: No abnormal muscle tone  Psychiatric:         Behavior: Behavior normal          The history was obtained from the review of the chart and from the patient      Lab Results:      Recent Results (from the past 79881 hour(s))   TSH+Free T4    Collection Time: 08/14/19  2:20 PM   Result Value Ref Range    TSH 0 06 (L) mIU/L    Free t4 1 7 0 8 - 1 8 ng/dL   TSH+Free T4    Collection Time: 10/07/19  3:18 PM   Result Value Ref Range    TSH 0 02 (L) 0 40 - 4 50 mIU/L    Free t4 1 6 0 8 - 1 8 ng/dL   TSH+Free T4    Collection Time: 11/18/19  1:52 PM   Result Value Ref Range    TSH 0 09 (L) 0 40 - 4 50 mIU/L    Free t4 1 2 0 8 - 1 8 ng/dL   TSH+Free T4    Collection Time: 12/27/19  1:21 PM   Result Value Ref Range    TSH 0 13 (L) 0 40 - 4 50 mIU/L    Free t4 1 2 0 8 - 1 8 ng/dL   TSH+Free T4    Collection Time: 02/21/20 12:03 PM   Result Value Ref Range    TSH 0 49 0 40 - 4 50 mIU/L    Free t4 1 1 0 8 - 1 8 ng/dL   Comprehensive metabolic panel    Collection Time: 09/09/20 11:23 AM   Result Value Ref Range    Glucose, Random 88 65 - 139 mg/dL    BUN 19 7 - 25 mg/dL    Creatinine 0 69 0 50 - 1 05 mg/dL    eGFR Non  97 > OR = 60 mL/min/1 73m2    eGFR  112 > OR = 60 mL/min/1 73m2    SL AMB BUN/CREATININE RATIO NOT APPLICABLE 6 - 22 (calc)    Sodium 143 135 - 146 mmol/L    Potassium 4 5 3 5 - 5 3 mmol/L    Chloride 109 98 - 110 mmol/L    CO2 26 20 - 32 mmol/L    Calcium 9 6 8 6 - 10 4 mg/dL    Protein, Total 6 3 6 1 - 8 1 g/dL    Albumin 4 1 3 6 - 5 1 g/dL    Globulin 2 2 1 9 - 3 7 g/dL (calc)    Albumin/Globulin Ratio 1 9 1 0 - 2 5 (calc)    TOTAL BILIRUBIN 0 5 0 2 - 1 2 mg/dL    Alkaline Phosphatase 67 37 - 153 U/L    AST 21 10 - 35 U/L    ALT 20 6 - 29 U/L   T4, free    Collection Time: 09/09/20 11:23 AM   Result Value Ref Range    Free t4 1 2 0 8 - 1 8 ng/dL   TSH, 3rd generation    Collection Time: 09/09/20 11:23 AM   Result Value Ref Range    TSH 0 76 0 40 - 4 50 mIU/L   Vitamin D 25 hydroxy    Collection Time: 09/09/20 11:23 AM   Result Value Ref Range    Vitamin D, 25-Hydroxy, Serum 58 30 - 100 ng/mL         No future appointments  Portions of the record may have been created with voice recognition software  Occasional wrong word or "sound a like" substitutions may have occurred due to the inherent limitations of voice recognition software  Read the chart carefully and recognize, using context, where substitutions have occurred

## 2020-12-28 DIAGNOSIS — E03.8 HYPOTHYROIDISM DUE TO HASHIMOTO'S THYROIDITIS: ICD-10-CM

## 2020-12-28 DIAGNOSIS — E06.3 HYPOTHYROIDISM DUE TO HASHIMOTO'S THYROIDITIS: ICD-10-CM

## 2020-12-28 RX ORDER — LEVOTHYROXINE SODIUM 0.1 MG/1
100 TABLET ORAL DAILY
Qty: 90 TABLET | Refills: 2 | Status: SHIPPED | OUTPATIENT
Start: 2020-12-28 | End: 2021-01-11

## 2021-01-11 DIAGNOSIS — E03.8 HYPOTHYROIDISM DUE TO HASHIMOTO'S THYROIDITIS: ICD-10-CM

## 2021-01-11 DIAGNOSIS — E06.3 HYPOTHYROIDISM DUE TO HASHIMOTO'S THYROIDITIS: ICD-10-CM

## 2021-01-11 RX ORDER — LEVOTHYROXINE SODIUM 0.1 MG/1
TABLET ORAL
Qty: 72 TABLET | Refills: 4 | Status: SHIPPED | OUTPATIENT
Start: 2021-01-11 | End: 2021-11-15

## 2021-02-19 DIAGNOSIS — E06.3 HYPOTHYROIDISM DUE TO HASHIMOTO'S THYROIDITIS: ICD-10-CM

## 2021-02-19 DIAGNOSIS — E03.8 HYPOTHYROIDISM DUE TO HASHIMOTO'S THYROIDITIS: ICD-10-CM

## 2021-02-19 RX ORDER — LEVOTHYROXINE SODIUM 88 UG/1
TABLET ORAL
Qty: 90 TABLET | Refills: 3 | Status: SHIPPED | OUTPATIENT
Start: 2021-02-19 | End: 2022-02-22

## 2021-05-08 LAB
T4 FREE SERPL-MCNC: 1.4 NG/DL (ref 0.8–1.8)
TSH SERPL-ACNC: 0.18 MIU/L (ref 0.4–4.5)

## 2021-05-10 DIAGNOSIS — E03.8 HYPOTHYROIDISM DUE TO HASHIMOTO'S THYROIDITIS: Primary | ICD-10-CM

## 2021-05-10 DIAGNOSIS — E06.3 HYPOTHYROIDISM DUE TO HASHIMOTO'S THYROIDITIS: Primary | ICD-10-CM

## 2021-06-12 LAB
T4 FREE SERPL-MCNC: 1.2 NG/DL (ref 0.8–1.8)
TSH SERPL-ACNC: 0.51 MIU/L (ref 0.4–4.5)

## 2021-09-21 LAB
25(OH)D3 SERPL-MCNC: 94 NG/ML (ref 30–100)
ALBUMIN SERPL-MCNC: 4.2 G/DL (ref 3.6–5.1)
ALBUMIN/GLOB SERPL: 1.8 (CALC) (ref 1–2.5)
ALP SERPL-CCNC: 66 U/L (ref 37–153)
ALT SERPL-CCNC: 21 U/L (ref 6–29)
AST SERPL-CCNC: 22 U/L (ref 10–35)
BILIRUB SERPL-MCNC: 0.5 MG/DL (ref 0.2–1.2)
BUN SERPL-MCNC: 19 MG/DL (ref 7–25)
BUN/CREAT SERPL: NORMAL (CALC) (ref 6–22)
CALCIUM SERPL-MCNC: 9.4 MG/DL (ref 8.6–10.4)
CALCIUM SERPL-MCNC: 9.5 MG/DL (ref 8.6–10.4)
CHLORIDE SERPL-SCNC: 108 MMOL/L (ref 98–110)
CO2 SERPL-SCNC: 27 MMOL/L (ref 20–32)
CREAT SERPL-MCNC: 0.56 MG/DL (ref 0.5–1.05)
GLOBULIN SER CALC-MCNC: 2.3 G/DL (CALC) (ref 1.9–3.7)
GLUCOSE SERPL-MCNC: 91 MG/DL (ref 65–99)
POTASSIUM SERPL-SCNC: 3.9 MMOL/L (ref 3.5–5.3)
PROT SERPL-MCNC: 6.5 G/DL (ref 6.1–8.1)
PTH-INTACT SERPL-MCNC: 51 PG/ML (ref 14–64)
SL AMB EGFR AFRICAN AMERICAN: 119 ML/MIN/1.73M2
SL AMB EGFR NON AFRICAN AMERICAN: 103 ML/MIN/1.73M2
SODIUM SERPL-SCNC: 142 MMOL/L (ref 135–146)
T4 FREE SERPL-MCNC: 1.3 NG/DL (ref 0.8–1.8)
TSH SERPL-ACNC: 0.44 MIU/L (ref 0.4–4.5)

## 2021-09-24 ENCOUNTER — OFFICE VISIT (OUTPATIENT)
Dept: ENDOCRINOLOGY | Facility: HOSPITAL | Age: 56
End: 2021-09-24
Payer: COMMERCIAL

## 2021-09-24 VITALS
SYSTOLIC BLOOD PRESSURE: 116 MMHG | WEIGHT: 248.2 LBS | HEART RATE: 78 BPM | HEIGHT: 67 IN | DIASTOLIC BLOOD PRESSURE: 78 MMHG | BODY MASS INDEX: 38.96 KG/M2

## 2021-09-24 DIAGNOSIS — E03.8 HYPOTHYROIDISM DUE TO HASHIMOTO'S THYROIDITIS: Primary | ICD-10-CM

## 2021-09-24 DIAGNOSIS — E55.9 VITAMIN D DEFICIENCY: ICD-10-CM

## 2021-09-24 DIAGNOSIS — E06.3 HYPOTHYROIDISM DUE TO HASHIMOTO'S THYROIDITIS: Primary | ICD-10-CM

## 2021-09-24 PROCEDURE — 99213 OFFICE O/P EST LOW 20 MIN: CPT | Performed by: NURSE PRACTITIONER

## 2021-09-24 RX ORDER — ROSUVASTATIN CALCIUM 20 MG/1
20 TABLET, COATED ORAL DAILY
COMMUNITY
Start: 2021-08-02

## 2021-09-24 NOTE — PROGRESS NOTES
Marilee Julio 60 y o  female MRN: [id number]    Encounter: [id number]      Assessment/Plan     Assessment: This is a 60y o -year-old female with Hypothyroidism and vitamin-D deficiency  Plan:  1  Hypothyroidism:  TSH is slightly low with a normal free T4  She complains of palpitations and some anxiety at times  For now, I have asked her to take levothyroxine 188 mcg  - 3 days a week and 100 mcg 4 days a week  Recheck TSH and free T4 in 6 weeks to reassess  We will continue current regimen repeat thyroid labs in 6 months and call her with the results  Follow-up in 1 year with another set of labs just prior      2  Vitamin-D deficiency:  Her most recent vitamin-D 25 hydroxy level is 94 0  She can take her ergocalciferol 50,000 units every other week  Recheck vitamin-D 25 hydroxy level prior to next visit  CC:   Hypothyroidism follow-up    History of Present Illness     HPI:  60y o  year old female with history of hypothyroidism due to Hashimoto's thyroiditis as well as vitamin-D deficiency who presents for a follow-up appointment  She continues on levothyroxine 188 mcg total 4 days a week and 100 mcg the other 3 days of the week  Her most recent TSH from September 18, 2021 is 0 440 with a free T4 of 1 3  Over time, her dose has been reduced due to positive lifestyle measures and weight loss associated with this  She presents today overall feeling well  No new health issues or symptoms of concern  For her vitamin-D deficiency, she supplements with ergocalciferol 78675 units weekly  Her most recent 25 hydroxy vitamin-D level is 94 0  Review of Systems   Constitutional: Negative  Negative for chills, fatigue and fever  HENT: Negative  Negative for trouble swallowing and voice change  Eyes: Negative  Negative for visual disturbance  Respiratory: Negative  Negative for chest tightness and shortness of breath  Cardiovascular: Positive for palpitations (  At times)   Negative for chest pain    Gastrointestinal: Negative  Negative for abdominal pain, constipation, diarrhea and vomiting  Endocrine: Negative for cold intolerance, heat intolerance, polydipsia, polyphagia and polyuria  Genitourinary: Negative  Musculoskeletal: Negative  Skin: Negative  Allergic/Immunologic: Negative  Neurological: Positive for headaches (at times)  Negative for dizziness, syncope and light-headedness  Hematological: Negative  Psychiatric/Behavioral: The patient is nervous/anxious ( at times)  All other systems reviewed and are negative  Historical Information   No past medical history on file  No past surgical history on file  Social History   Social History     Substance and Sexual Activity   Alcohol Use None     Social History     Substance and Sexual Activity   Drug Use Not on file     Social History     Tobacco Use   Smoking Status Never Smoker   Smokeless Tobacco Never Used     Family History:   Family History   Problem Relation Age of Onset    Cancer Mother     No Known Problems Father     Diabetes unspecified Sister     Diabetes unspecified Brother     Diabetes unspecified Family        Meds/Allergies   Current Outpatient Medications   Medication Sig Dispense Refill    atenolol (TENORMIN) 25 mg tablet Take 25 mg by mouth daily  3    Calcium Carbonate-Vit D-Min (CALCIUM 1200 PO) Take by mouth      ergocalciferol (VITAMIN D2) 50,000 units Take 50,000 Units by mouth once a week  3    levothyroxine 100 mcg tablet TAKE 1 TABLET BY MOUTH DAILY 6 DAYS OF THE WEEK  NO TAB SUNDAYS  (Patient taking differently: Take 100 mcg by mouth daily ) 72 tablet 4    levothyroxine 88 mcg tablet 1 tab 4 days of the week  No tab 3 days of the week   90 tablet 3    Multiple Vitamin (MULTIVITAMIN) capsule Take 1 capsule by mouth daily      rosuvastatin (CRESTOR) 20 MG tablet Take 20 mg by mouth daily      venlafaxine (EFFEXOR-XR) 150 mg 24 hr capsule Take 150 mg by mouth daily  0    hydrochlorothiazide (HYDRODIURIL) 25 mg tablet Take 25 mg by mouth daily (Patient not taking: Reported on 9/24/2021)  3    pravastatin (PRAVACHOL) 40 mg tablet Take 40 mg by mouth daily (Patient not taking: Reported on 9/24/2021)  0     No current facility-administered medications for this visit  No Known Allergies    Objective   Vitals: Blood pressure 116/78, pulse 78, height 5' 7" (1 702 m), weight 113 kg (248 lb 3 2 oz)  Physical Exam  Vitals reviewed  Constitutional:       Appearance: She is well-developed  She is obese  HENT:      Head: Normocephalic and atraumatic  Eyes:      Conjunctiva/sclera: Conjunctivae normal       Pupils: Pupils are equal, round, and reactive to light  Comments: Wears glasses   Cardiovascular:      Rate and Rhythm: Normal rate and regular rhythm  Heart sounds: Normal heart sounds  Pulmonary:      Effort: Pulmonary effort is normal       Breath sounds: Normal breath sounds  Abdominal:      General: Bowel sounds are normal       Palpations: Abdomen is soft  Musculoskeletal:         General: Normal range of motion  Cervical back: Normal range of motion and neck supple  Skin:     General: Skin is warm and dry  Neurological:      Mental Status: She is alert and oriented to person, place, and time  Psychiatric:         Behavior: Behavior normal          Thought Content: Thought content normal          Judgment: Judgment normal          Lab Results:   Lab Results   Component Value Date/Time    Free t4 1 3 09/18/2021 09:29 AM    Free t4 1 2 06/11/2021 01:49 PM    Free t4 1 4 05/07/2021 11:41 AM       Imaging Studies:   Results for orders placed during the hospital encounter of 08/22/19    US thyroid    Impression  Diffusely heterogeneous and atrophic thyroid gland consistent with prior history of Hashimoto's thyroiditis      On image #9 there was a right lower pole nodule measured, however, this is felt to be heterogeneous thyroid tissue as opposed to a true thyroid nodule  Even if this was a true thyroid nodule it would not meet current ACR criteria requiring biopsy or  follow-up ultrasounds  Reference: ACR Thyroid Imaging, Reporting and Data System (TI-RADS): White Paper of the [employer]  J AM Sekou Radiol 9336;49:[id number]  (additional recommendations based on American Thyroid Association 2015 guidelines )    IShaq PA-C, am acting as a scribe while in the presence of the attending physician  I, Dr Carl Giron, personally reviewed and interpreted the study in this report as scribed in my presence  Workstation performed: [id number]    Portions of the record may have been created with voice recognition software  Occasional wrong word or "sound a like" substitutions may have occurred due to the inherent limitations of voice recognition software  Read the chart carefully and recognize, using context, where substitutions have occurred

## 2021-09-24 NOTE — PATIENT INSTRUCTIONS
As discussed, please take Levothyroxine 188 mcg - 3 days per week and 100 mcg 4 days a week  Recheck TSH and Free T4 in 6-8 weeks to reassess  You can take your ergocalciferol 50,000 mg every other week  Obtain lab work as prescribed

## 2021-11-14 DIAGNOSIS — E06.3 HYPOTHYROIDISM DUE TO HASHIMOTO'S THYROIDITIS: ICD-10-CM

## 2021-11-14 DIAGNOSIS — E03.8 HYPOTHYROIDISM DUE TO HASHIMOTO'S THYROIDITIS: ICD-10-CM

## 2021-11-15 RX ORDER — LEVOTHYROXINE SODIUM 0.1 MG/1
100 TABLET ORAL DAILY
Qty: 90 TABLET | Refills: 3 | Status: SHIPPED | OUTPATIENT
Start: 2021-11-15 | End: 2022-01-18

## 2021-11-19 LAB
T4 FREE SERPL-MCNC: 1.1 NG/DL (ref 0.8–1.8)
TSH SERPL-ACNC: 1.88 MIU/L (ref 0.4–4.5)

## 2022-01-18 DIAGNOSIS — E06.3 HYPOTHYROIDISM DUE TO HASHIMOTO'S THYROIDITIS: ICD-10-CM

## 2022-01-18 DIAGNOSIS — E03.8 HYPOTHYROIDISM DUE TO HASHIMOTO'S THYROIDITIS: ICD-10-CM

## 2022-01-18 RX ORDER — LEVOTHYROXINE SODIUM 0.1 MG/1
TABLET ORAL
Qty: 72 TABLET | Refills: 4 | Status: SHIPPED | OUTPATIENT
Start: 2022-01-18

## 2022-02-22 DIAGNOSIS — E03.8 HYPOTHYROIDISM DUE TO HASHIMOTO'S THYROIDITIS: ICD-10-CM

## 2022-02-22 DIAGNOSIS — E06.3 HYPOTHYROIDISM DUE TO HASHIMOTO'S THYROIDITIS: ICD-10-CM

## 2022-02-22 RX ORDER — LEVOTHYROXINE SODIUM 88 UG/1
TABLET ORAL
Qty: 52 TABLET | Refills: 6 | Status: SHIPPED | OUTPATIENT
Start: 2022-02-22

## 2022-03-18 LAB
T4 FREE SERPL-MCNC: 1.1 NG/DL (ref 0.8–1.8)
TSH SERPL-ACNC: 1.75 MIU/L (ref 0.4–4.5)

## 2022-08-31 ENCOUNTER — TELEPHONE (OUTPATIENT)
Dept: ENDOCRINOLOGY | Facility: HOSPITAL | Age: 57
End: 2022-08-31

## 2022-08-31 NOTE — TELEPHONE ENCOUNTER
Patient said she has always taken the Vitamin D 50,000 units twice a month  The doctor that used to fill it retired and then that office closed  She is asking if you would be willing to fill it? PCP wont fill it

## 2022-09-01 DIAGNOSIS — E03.8 HYPOTHYROIDISM DUE TO HASHIMOTO'S THYROIDITIS: Primary | ICD-10-CM

## 2022-09-01 DIAGNOSIS — E06.3 HYPOTHYROIDISM DUE TO HASHIMOTO'S THYROIDITIS: Primary | ICD-10-CM

## 2022-09-01 RX ORDER — ERGOCALCIFEROL 1.25 MG/1
50000 CAPSULE ORAL WEEKLY
Qty: 12 CAPSULE | Refills: 3 | Status: SHIPPED | OUTPATIENT
Start: 2022-09-01

## 2022-09-30 ENCOUNTER — OFFICE VISIT (OUTPATIENT)
Dept: ENDOCRINOLOGY | Facility: HOSPITAL | Age: 57
End: 2022-09-30
Payer: COMMERCIAL

## 2022-09-30 VITALS
HEIGHT: 67 IN | BODY MASS INDEX: 40.02 KG/M2 | SYSTOLIC BLOOD PRESSURE: 110 MMHG | WEIGHT: 255 LBS | HEART RATE: 74 BPM | DIASTOLIC BLOOD PRESSURE: 72 MMHG

## 2022-09-30 DIAGNOSIS — E03.8 HYPOTHYROIDISM DUE TO HASHIMOTO'S THYROIDITIS: Primary | ICD-10-CM

## 2022-09-30 DIAGNOSIS — E06.3 HYPOTHYROIDISM DUE TO HASHIMOTO'S THYROIDITIS: Primary | ICD-10-CM

## 2022-09-30 PROCEDURE — 99213 OFFICE O/P EST LOW 20 MIN: CPT | Performed by: PHYSICIAN ASSISTANT

## 2022-09-30 RX ORDER — LEVOTHYROXINE SODIUM 0.1 MG/1
TABLET ORAL
Qty: 90 TABLET | Refills: 3 | Status: SHIPPED | OUTPATIENT
Start: 2022-09-30

## 2022-09-30 RX ORDER — BUPROPION HYDROCHLORIDE 75 MG/1
1 TABLET ORAL 2 TIMES DAILY
COMMUNITY
Start: 2022-08-22

## 2022-09-30 RX ORDER — LEVOTHYROXINE SODIUM 88 UG/1
TABLET ORAL
Qty: 37 TABLET | Refills: 3 | Status: SHIPPED | OUTPATIENT
Start: 2022-09-30

## 2022-09-30 NOTE — PROGRESS NOTES
Viky Jones 59 y o  female MRN: [id number]    Encounter: [id number]      Assessment/Plan     Assessment: This is a 59y o -year-old female with hypothyroidism and vitamin-D deficiency  Plan:  1  Hypothyroidism:  Most recent thyroid lab work came back with a slightly elevated TSH and normal free T4  No symptoms of hypothyroidism at this time  She will continue with levothyroxine 188 mcg 3 days a week and 100 mcg 4 days a week  Asked her to repeat lab work in about 3-4 months to see how thyroid function is trending  Contact the office with any concerns or questions  Follow-up in 1 year with lab work completed prior to visit  2  Vitamin-D deficiency:  Vitamin-D levels came back normal   Continue with current vitamin-D supplement  Make sure to repeat lab work prior to next office visit  CC:  Hypothyroidism follow-up    History of Present Illness     HPI:  61 year old female with hypothyroidism due to Hashimoto's thyroiditis as well as vitamin-D deficiency who presents for a follow-up appointment  Micah Magaña continues on levothyroxine 188 mcg total 3 days a week and 100 mcg the other 4 days of the week  Her most recent TSH from September 26, 2022 is 4 82 with a free T4 of 1 1   Over time, her dose has been reduced due to positive lifestyle measures and weight loss associated with this  Micah Magaña presents today overall feeling well   No new health issues or symptoms of concern  Denies any fatigue, heat or cold intolerance, abdominal pain, diarrhea constipation, hair loss, tremors, change in sleeping habits  Does continue with the occasional palpitations  Also has dry skin      For her vitamin-D deficiency, she supplements with ergocalciferol 38259 units weekly  Her most recent 25 hydroxy vitamin-D level is 94 0  Review of Systems   Constitutional: Negative for activity change, appetite change, diaphoresis, fatigue and unexpected weight change     HENT: Negative for sore throat, trouble swallowing and voice change  Eyes: Negative for visual disturbance  Respiratory: Negative for chest tightness and shortness of breath  Cardiovascular: Positive for palpitations (Occasional)  Negative for chest pain and leg swelling  Gastrointestinal: Negative for abdominal pain, constipation and diarrhea  Endocrine: Negative for cold intolerance, heat intolerance, polydipsia, polyphagia and polyuria  Skin: Negative for rash  Dry skin   Neurological: Negative for dizziness, tremors, light-headedness, numbness and headaches  Hematological: Negative for adenopathy  Psychiatric/Behavioral: Negative for dysphoric mood and sleep disturbance  The patient is not nervous/anxious  Historical Information   No past medical history on file  No past surgical history on file  Social History   Social History     Substance and Sexual Activity   Alcohol Use None     Social History     Substance and Sexual Activity   Drug Use Not on file     Social History     Tobacco Use   Smoking Status Never Smoker   Smokeless Tobacco Never Used     Family History:   Family History   Problem Relation Age of Onset    Cancer Mother     No Known Problems Father     Diabetes unspecified Sister     Diabetes unspecified Brother     Diabetes unspecified Family        Meds/Allergies   Current Outpatient Medications   Medication Sig Dispense Refill    atenolol (TENORMIN) 25 mg tablet Take 25 mg by mouth daily  3    Calcium Carbonate-Vit D-Min (CALCIUM 1200 PO) Take by mouth      ergocalciferol (VITAMIN D2) 50,000 units Take 1 capsule (50,000 Units total) by mouth once a week 12 capsule 3    hydrochlorothiazide (HYDRODIURIL) 25 mg tablet Take 25 mg by mouth daily (Patient not taking: Reported on 9/24/2021)  3    levothyroxine 100 mcg tablet TAKE 1 TABLET BY MOUTH DAILY 6 DAYS OF THE WEEK  NO TAB SUNDAYS  72 tablet 4    levothyroxine 88 mcg tablet 1 TAB 4 DAYS OF THE WEEK  NO TAB 3 DAYS OF THE WEEK   52 tablet 6    Multiple Vitamin (MULTIVITAMIN) capsule Take 1 capsule by mouth daily      pravastatin (PRAVACHOL) 40 mg tablet Take 40 mg by mouth daily (Patient not taking: Reported on 9/24/2021)  0    rosuvastatin (CRESTOR) 20 MG tablet Take 20 mg by mouth daily      venlafaxine (EFFEXOR-XR) 150 mg 24 hr capsule Take 150 mg by mouth daily  0     No current facility-administered medications for this visit  No Known Allergies    Objective   Vitals: There were no vitals taken for this visit  Physical Exam  Vitals and nursing note reviewed  Constitutional:       General: She is not in acute distress  Appearance: Normal appearance  She is not diaphoretic  HENT:      Head: Normocephalic and atraumatic  Eyes:      General: No scleral icterus  Extraocular Movements: Extraocular movements intact  Conjunctiva/sclera: Conjunctivae normal       Pupils: Pupils are equal, round, and reactive to light  Cardiovascular:      Rate and Rhythm: Normal rate and regular rhythm  Heart sounds: No murmur heard  Pulmonary:      Effort: Pulmonary effort is normal  No respiratory distress  Breath sounds: Normal breath sounds  No wheezing  Musculoskeletal:      Cervical back: Normal range of motion  Right lower leg: No edema  Left lower leg: No edema  Lymphadenopathy:      Cervical: No cervical adenopathy  Neurological:      Mental Status: She is alert and oriented to person, place, and time  Mental status is at baseline  Sensory: No sensory deficit  Gait: Gait normal    Psychiatric:         Mood and Affect: Mood normal          Behavior: Behavior normal          Thought Content: Thought content normal          The history was obtained from the review of the chart, patient      Lab Results:   Lab Results   Component Value Date/Time    Free t4 1 1 03/18/2022 11:36 AM    Free t4 1 1 11/19/2021 07:30 AM       Imaging Studies:   Results for orders placed during the hospital encounter of 08/22/19     thyroid    Impression  Diffusely heterogeneous and atrophic thyroid gland consistent with prior history of Hashimoto's thyroiditis  On image #9 there was a right lower pole nodule measured, however, this is felt to be heterogeneous thyroid tissue as opposed to a true thyroid nodule  Even if this was a true thyroid nodule it would not meet current ACR criteria requiring biopsy or  follow-up ultrasounds  Reference: ACR Thyroid Imaging, Reporting and Data System (TI-RADS): White Paper of the [employer]  J AM Sekou Radiol 6286;31:208-861  (additional recommendations based on American Thyroid Association 2015 guidelines )    I, Iliana Dorsey PA-C, am acting as a scribe while in the presence of the attending physician  I, Dr Timi Sawyer, personally reviewed and interpreted the study in this report as scribed in my presence  Workstation performed: [id number]      I have personally reviewed pertinent reports  Portions of the record may have been created with voice recognition software  Occasional wrong word or "sound a like" substitutions may have occurred due to the inherent limitations of voice recognition software  Read the chart carefully and recognize, using context, where substitutions have occurred

## 2022-09-30 NOTE — PATIENT INSTRUCTIONS
Continue same dose of levothyroxine at this time  Repeat lab work in 3-4 months  Call with any concerns or questions  Follow up in 1 year with lab work prior to visit

## 2023-02-17 LAB
T4 FREE SERPL-MCNC: 1.3 NG/DL (ref 0.8–1.8)
TSH SERPL-ACNC: 1.1 MIU/L (ref 0.4–4.5)

## 2023-09-19 DIAGNOSIS — E06.3 HYPOTHYROIDISM DUE TO HASHIMOTO'S THYROIDITIS: ICD-10-CM

## 2023-09-19 DIAGNOSIS — E03.8 HYPOTHYROIDISM DUE TO HASHIMOTO'S THYROIDITIS: ICD-10-CM

## 2023-09-19 RX ORDER — LEVOTHYROXINE SODIUM 0.1 MG/1
TABLET ORAL
Qty: 90 TABLET | Refills: 3 | Status: SHIPPED | OUTPATIENT
Start: 2023-09-19

## 2023-09-29 LAB
25(OH)D3 SERPL-MCNC: 75 NG/ML (ref 30–100)
ALBUMIN SERPL-MCNC: 4.4 G/DL (ref 3.6–5.1)
ALBUMIN/GLOB SERPL: 2.2 (CALC) (ref 1–2.5)
ALP SERPL-CCNC: 64 U/L (ref 37–153)
ALT SERPL-CCNC: 19 U/L (ref 6–29)
AST SERPL-CCNC: 24 U/L (ref 10–35)
BILIRUB SERPL-MCNC: 0.5 MG/DL (ref 0.2–1.2)
BUN SERPL-MCNC: 26 MG/DL (ref 7–25)
BUN/CREAT SERPL: 31 (CALC) (ref 6–22)
CALCIUM SERPL-MCNC: 9.3 MG/DL (ref 8.6–10.4)
CHLORIDE SERPL-SCNC: 108 MMOL/L (ref 98–110)
CO2 SERPL-SCNC: 27 MMOL/L (ref 20–32)
CREAT SERPL-MCNC: 0.83 MG/DL (ref 0.5–1.05)
GFR/BSA.PRED SERPLBLD CYS-BASED-ARV: 81 ML/MIN/1.73M2
GLOBULIN SER CALC-MCNC: 2 G/DL (CALC) (ref 1.9–3.7)
GLUCOSE SERPL-MCNC: 90 MG/DL (ref 65–99)
POTASSIUM SERPL-SCNC: 4.2 MMOL/L (ref 3.5–5.3)
PROT SERPL-MCNC: 6.4 G/DL (ref 6.1–8.1)
SODIUM SERPL-SCNC: 142 MMOL/L (ref 135–146)
T4 FREE SERPL-MCNC: 1 NG/DL (ref 0.8–1.8)
TSH SERPL-ACNC: 2.66 MIU/L (ref 0.4–4.5)

## 2023-10-06 ENCOUNTER — OFFICE VISIT (OUTPATIENT)
Dept: ENDOCRINOLOGY | Facility: HOSPITAL | Age: 58
End: 2023-10-06
Payer: COMMERCIAL

## 2023-10-06 VITALS
SYSTOLIC BLOOD PRESSURE: 118 MMHG | HEART RATE: 66 BPM | BODY MASS INDEX: 40.81 KG/M2 | DIASTOLIC BLOOD PRESSURE: 78 MMHG | WEIGHT: 260 LBS | OXYGEN SATURATION: 98 % | HEIGHT: 67 IN

## 2023-10-06 DIAGNOSIS — E06.3 HYPOTHYROIDISM DUE TO HASHIMOTO'S THYROIDITIS: Primary | ICD-10-CM

## 2023-10-06 DIAGNOSIS — E55.9 VITAMIN D DEFICIENCY: ICD-10-CM

## 2023-10-06 DIAGNOSIS — E03.8 HYPOTHYROIDISM DUE TO HASHIMOTO'S THYROIDITIS: Primary | ICD-10-CM

## 2023-10-06 PROCEDURE — 99213 OFFICE O/P EST LOW 20 MIN: CPT | Performed by: PHYSICIAN ASSISTANT

## 2023-10-06 NOTE — PATIENT INSTRUCTIONS
Continue same dose of levothyroxine at this time. Repeat lab work in 6 months. Call with any concerns or questions. Follow up in 1 year with lab work prior to visit.

## 2023-10-06 NOTE — PROGRESS NOTES
Roland Cordoba 59 y.o. female MRN: [id number]    Encounter: [id number]      Assessment/Plan     Assessment: This is a 59y.o.-year-old female with hypothyroidism and vitamin D deficiency. Plan:  1. Hypothyroidism: Most recent thyroid lab work came back normal.  Clinically and biochemically euthyroid. At this time she will continue levothyroxine 100 mcg 4 days a week and 188 mcg, consisting of a 100 mg tablet and an 88 mcg tablet, 3 days a week. Contact the office if there is any change in symptoms. Follow-up in 1 year with lab work completed prior to visit. 2. Vitamin-D deficiency:  Vitamin-D levels came back normal.  Continue with current vitamin-D supplement. Make sure to repeat lab work prior to next office visit. CC: Hypothyroidism follow-up    History of Present Illness     HPI:  59year old female with hypothyroidism due to Hashimoto's thyroiditis as well as vitamin-D deficiency who presents for a follow-up appointment. She continues on levothyroxine 188 mcg total 3 days a week and 100 mcg the other 4 days of the week. His recent thyroid lab work completed September 29, 2023 reveals a TSH of 2.66 and a free T4 of 1.0. She presents today overall feeling well. Has no concerns or questions today. No new health issues or symptoms of concern. Denies any fatigue, cold intolerance, abdominal pain, diarrhea constipation, hair loss, tremors, change in sleeping habits. Has some heat intolerance. Does continue with the occasional palpitations. Also has dry skin. Denies any neck discomfort or difficulty swallowing. For her vitamin-D deficiency, she supplements with ergocalciferol 49763 units weekly. Her most recent 25 hydroxy vitamin-D level is 94.0. Review of Systems   Constitutional:  Negative for activity change, appetite change, diaphoresis, fatigue and unexpected weight change. HENT:  Negative for sore throat, trouble swallowing and voice change.     Eyes:  Negative for visual disturbance. Respiratory:  Negative for chest tightness and shortness of breath. Cardiovascular:  Positive for palpitations. Negative for chest pain and leg swelling. Gastrointestinal:  Negative for abdominal pain, constipation and diarrhea. Endocrine: Positive for heat intolerance. Negative for cold intolerance, polydipsia, polyphagia and polyuria. Skin:  Negative for rash. Dry skin   Neurological:  Negative for dizziness, tremors, light-headedness, numbness and headaches. Hematological:  Negative for adenopathy. Psychiatric/Behavioral:  Negative for dysphoric mood and sleep disturbance. The patient is not nervous/anxious. Historical Information   History reviewed. No pertinent past medical history. History reviewed. No pertinent surgical history.   Social History   Social History     Substance and Sexual Activity   Alcohol Use None     Social History     Substance and Sexual Activity   Drug Use Not on file     Social History     Tobacco Use   Smoking Status Never   Smokeless Tobacco Never     Family History:   Family History   Problem Relation Age of Onset   • Cancer Mother    • No Known Problems Father    • Diabetes unspecified Sister    • Diabetes unspecified Brother    • Diabetes unspecified Family        Meds/Allergies   Current Outpatient Medications   Medication Sig Dispense Refill   • atenolol (TENORMIN) 25 mg tablet Take 25 mg by mouth daily  3   • BUPROPION HCL PO Take 300 mg by mouth daily     • Calcium Carbonate-Vit D-Min (CALCIUM 1200 PO) Take by mouth     • ergocalciferol (VITAMIN D2) 50,000 units Take 1 capsule (50,000 Units total) by mouth once a week 12 capsule 3   • levothyroxine 100 mcg tablet TAKING ONE TABLET BY MOUTH 7 DAYS A WEEK 90 tablet 3   • levothyroxine 88 mcg tablet 1 tab 3 days a week 37 tablet 3   • Multiple Vitamin (MULTIVITAMIN) capsule Take 1 capsule by mouth daily     • rosuvastatin (CRESTOR) 20 MG tablet Take 20 mg by mouth daily     • venlafaxine (EFFEXOR-XR) 150 mg 24 hr capsule Take 150 mg by mouth daily  0   • hydrochlorothiazide (HYDRODIURIL) 25 mg tablet Take 25 mg by mouth daily (Patient not taking: Reported on 9/24/2021)  3   • pravastatin (PRAVACHOL) 40 mg tablet Take 40 mg by mouth daily  0     No current facility-administered medications for this visit. No Known Allergies    Objective   Vitals: Blood pressure 118/78, pulse 66, height 5' 7" (1.702 m), weight 118 kg (260 lb), SpO2 98 %. Physical Exam  Vitals and nursing note reviewed. Constitutional:       General: She is not in acute distress. Appearance: Normal appearance. She is not diaphoretic. HENT:      Head: Normocephalic and atraumatic. Eyes:      General: No scleral icterus. Extraocular Movements: Extraocular movements intact. Conjunctiva/sclera: Conjunctivae normal.      Pupils: Pupils are equal, round, and reactive to light. Cardiovascular:      Rate and Rhythm: Normal rate and regular rhythm. Heart sounds: No murmur heard. Pulmonary:      Effort: Pulmonary effort is normal. No respiratory distress. Breath sounds: Normal breath sounds. No wheezing. Musculoskeletal:      Cervical back: Normal range of motion. Right lower leg: No edema. Left lower leg: No edema. Lymphadenopathy:      Cervical: No cervical adenopathy. Neurological:      Mental Status: She is alert and oriented to person, place, and time. Mental status is at baseline. Sensory: No sensory deficit. Motor: No tremor. Gait: Gait normal.      Deep Tendon Reflexes:      Reflex Scores:       Patellar reflexes are 2+ on the right side and 2+ on the left side. Psychiatric:         Mood and Affect: Mood normal.         Behavior: Behavior normal.         Thought Content: Thought content normal.         The history was obtained from the review of the chart, patient.     Lab Results:   Lab Results   Component Value Date/Time    Free t4 1.0 09/29/2023 07:29 AM    Free t4 1.3 02/17/2023 07:19 AM       Imaging Studies:   Results for orders placed during the hospital encounter of 08/22/19    US thyroid    Impression  Diffusely heterogeneous and atrophic thyroid gland consistent with prior history of Hashimoto's thyroiditis. On image #9 there was a right lower pole nodule measured, however, this is felt to be heterogeneous thyroid tissue as opposed to a true thyroid nodule. Even if this was a true thyroid nodule it would not meet current ACR criteria requiring biopsy or  follow-up ultrasounds. Reference: ACR Thyroid Imaging, Reporting and Data System (TI-RADS): White Paper of the [employer]. J AM Sek[employer] Radiol 2506;94:712-938. (additional recommendations based on American Thyroid Association 2015 guidelines.)    I, Susanne Robles PA-C, am acting as a scribe while in the presence of the attending physician. I, Dr. Elizabeth Daley, personally reviewed and interpreted the study in this report as scribed in my presence. Workstation performed: [id number]      I have personally reviewed pertinent reports. Portions of the record may have been created with voice recognition software. Occasional wrong word or "sound a like" substitutions may have occurred due to the inherent limitations of voice recognition software. Read the chart carefully and recognize, using context, where substitutions have occurred.

## 2023-12-19 ENCOUNTER — DOCUMENTATION (OUTPATIENT)
Dept: ENDOCRINOLOGY | Facility: HOSPITAL | Age: 58
End: 2023-12-19

## 2024-04-06 LAB
T4 FREE SERPL-MCNC: 1 NG/DL (ref 0.8–1.8)
TSH SERPL-ACNC: 1.56 MIU/L (ref 0.4–4.5)

## 2024-04-10 ENCOUNTER — TELEPHONE (OUTPATIENT)
Dept: ENDOCRINOLOGY | Facility: HOSPITAL | Age: 59
End: 2024-04-10

## 2024-04-10 NOTE — TELEPHONE ENCOUNTER
----- Message from Mundo Lloyd PA-C sent at 4/9/2024  2:32 PM EDT -----  Recent thyroid lab work came back normal.  Continue with same thyroid medication at this time.

## 2024-10-04 LAB
25(OH)D3 SERPL-MCNC: 57 NG/ML (ref 30–100)
ALBUMIN SERPL-MCNC: 4.2 G/DL (ref 3.6–5.1)
ALBUMIN/GLOB SERPL: 1.9 (CALC) (ref 1–2.5)
ALP SERPL-CCNC: 67 U/L (ref 37–153)
ALT SERPL-CCNC: 18 U/L (ref 6–29)
AST SERPL-CCNC: 19 U/L (ref 10–35)
BILIRUB SERPL-MCNC: 0.4 MG/DL (ref 0.2–1.2)
BUN SERPL-MCNC: 23 MG/DL (ref 7–25)
BUN/CREAT SERPL: NORMAL (CALC) (ref 6–22)
CALCIUM SERPL-MCNC: 9.6 MG/DL (ref 8.6–10.4)
CHLORIDE SERPL-SCNC: 107 MMOL/L (ref 98–110)
CO2 SERPL-SCNC: 28 MMOL/L (ref 20–32)
CREAT SERPL-MCNC: 0.68 MG/DL (ref 0.5–1.05)
GFR/BSA.PRED SERPLBLD CYS-BASED-ARV: 99 ML/MIN/1.73M2
GLOBULIN SER CALC-MCNC: 2.2 G/DL (CALC) (ref 1.9–3.7)
GLUCOSE SERPL-MCNC: 86 MG/DL (ref 65–139)
POTASSIUM SERPL-SCNC: 4.4 MMOL/L (ref 3.5–5.3)
PROT SERPL-MCNC: 6.4 G/DL (ref 6.1–8.1)
SODIUM SERPL-SCNC: 141 MMOL/L (ref 135–146)
T4 FREE SERPL-MCNC: 0.9 NG/DL (ref 0.8–1.8)
TSH SERPL-ACNC: 2.25 MIU/L (ref 0.4–4.5)

## 2024-10-11 ENCOUNTER — OFFICE VISIT (OUTPATIENT)
Dept: ENDOCRINOLOGY | Facility: HOSPITAL | Age: 59
End: 2024-10-11
Payer: COMMERCIAL

## 2024-10-11 VITALS
HEART RATE: 70 BPM | OXYGEN SATURATION: 97 % | HEIGHT: 67 IN | BODY MASS INDEX: 40.34 KG/M2 | SYSTOLIC BLOOD PRESSURE: 110 MMHG | DIASTOLIC BLOOD PRESSURE: 70 MMHG | WEIGHT: 257 LBS

## 2024-10-11 DIAGNOSIS — E06.3 HYPOTHYROIDISM DUE TO HASHIMOTO'S THYROIDITIS: Primary | ICD-10-CM

## 2024-10-11 DIAGNOSIS — E55.9 VITAMIN D DEFICIENCY: ICD-10-CM

## 2024-10-11 PROCEDURE — 99214 OFFICE O/P EST MOD 30 MIN: CPT | Performed by: PHYSICIAN ASSISTANT

## 2024-10-11 NOTE — PROGRESS NOTES
Ita Chaves 59 y.o. female MRN: [id number]    Encounter: [id number]      Assessment & Plan     Assessment:  This is a 59 y.o.-year-old female with hypothyroidism and vitamin D deficiency.    Plan:  1. Hypothyroidism: Most recent thyroid lab work back normal.  Clinically and biochemically euthyroid.  At this time she will continue levothyroxine 100 mcg 4 days a week and 188 mcg 3 days a week.  Will contact the office if there is any concerns or questions.  Follow-up in 1 year with labwork completed prior to visit.     2. Vitamin-D deficiency:  Vitamin-D levels came back normal.  Continue with current vitamin-D supplement.  Make sure to repeat lab work prior to next office visit.    CC: Hypothyroidism follow-up    History of Present Illness     HPI:  59 year old female with hypothyroidism due to Hashimoto's thyroiditis as well as vitamin-D deficiency who presents for a follow-up appointment.  She continues on levothyroxine 188 mcg total 3 days a week and 100 mcg the other 4 days of the week.  His recent thyroid lab work completed October 3, 2024 reveals a TSH of 2.25 and a free T4 of 0.9.  No new health issues or symptoms of concern.  Denies any fatigue, cold intolerance, abdominal pain, diarrhea constipation, hair loss, tremors, change in sleeping habits.  Has some heat intolerance.  Does continue with the occasional palpitations, has been no change in the symptoms.  Also has dry skin.  Denies any neck discomfort or difficulty swallowing.  Doing well today without any concerns.     For her vitamin-D deficiency, she supplements with ergocalciferol 52829 units every other week.  Her most recent 25 hydroxy vitamin-D level is 57.    Review of Systems   Constitutional:  Negative for activity change, appetite change, diaphoresis, fatigue and unexpected weight change.   HENT:  Negative for sore throat, trouble swallowing and voice change.    Eyes:  Negative for visual disturbance.   Respiratory:  Negative for chest  tightness and shortness of breath.    Cardiovascular:  Positive for palpitations. Negative for chest pain and leg swelling.   Gastrointestinal:  Negative for abdominal pain, constipation and diarrhea.   Endocrine: Positive for heat intolerance. Negative for cold intolerance, polydipsia, polyphagia and polyuria.   Skin:  Negative for rash.        Dry skin   Neurological:  Negative for dizziness, tremors, light-headedness, numbness and headaches.   Hematological:  Negative for adenopathy.   Psychiatric/Behavioral:  Negative for dysphoric mood and sleep disturbance. The patient is not nervous/anxious.        Historical Information   History reviewed. No pertinent past medical history.  History reviewed. No pertinent surgical history.  Social History   Social History     Substance and Sexual Activity   Alcohol Use None     Social History     Substance and Sexual Activity   Drug Use Not on file     Social History     Tobacco Use   Smoking Status Never   Smokeless Tobacco Never     Family History:   Family History   Problem Relation Age of Onset    Cancer Mother     No Known Problems Father     Diabetes unspecified Sister     Diabetes unspecified Brother     Diabetes unspecified Family        Meds/Allergies   Current Outpatient Medications   Medication Sig Dispense Refill    atenolol (TENORMIN) 25 mg tablet Take 25 mg by mouth daily  3    Calcium Carbonate-Vit D-Min (CALCIUM 1200 PO) Take by mouth      ergocalciferol (VITAMIN D2) 50,000 units Take 1 capsule (50,000 Units total) by mouth once a week (Patient taking differently: Take 50,000 Units by mouth every 14 (fourteen) days) 12 capsule 3    levothyroxine 100 mcg tablet TAKING ONE TABLET BY MOUTH 7 DAYS A WEEK 90 tablet 3    levothyroxine 88 mcg tablet 1 tab 3 days a week 37 tablet 3    Multiple Vitamin (MULTIVITAMIN) capsule Take 1 capsule by mouth daily      rosuvastatin (CRESTOR) 20 MG tablet Take 20 mg by mouth daily      venlafaxine (EFFEXOR-XR) 150 mg 24 hr  "capsule Take 150 mg by mouth daily  0    pravastatin (PRAVACHOL) 40 mg tablet Take 40 mg by mouth daily (Patient not taking: Reported on 10/11/2024)  0     No current facility-administered medications for this visit.     No Known Allergies    Objective   Vitals: Blood pressure 110/70, pulse 70, height 5' 7\" (1.702 m), weight 117 kg (257 lb), SpO2 97%.    Physical Exam  Vitals and nursing note reviewed.   Constitutional:       General: She is not in acute distress.     Appearance: Normal appearance. She is not diaphoretic.   HENT:      Head: Normocephalic and atraumatic.   Eyes:      General: No scleral icterus.     Extraocular Movements: Extraocular movements intact.      Conjunctiva/sclera: Conjunctivae normal.      Pupils: Pupils are equal, round, and reactive to light.   Cardiovascular:      Rate and Rhythm: Normal rate and regular rhythm.      Heart sounds: No murmur heard.  Pulmonary:      Effort: Pulmonary effort is normal. No respiratory distress.      Breath sounds: Normal breath sounds. No wheezing.   Musculoskeletal:         General: No swelling.      Cervical back: Normal range of motion.   Lymphadenopathy:      Cervical: No cervical adenopathy.   Neurological:      Mental Status: She is alert and oriented to person, place, and time. Mental status is at baseline.      Sensory: No sensory deficit.      Gait: Gait normal.   Psychiatric:         Mood and Affect: Mood normal.         Behavior: Behavior normal.         Thought Content: Thought content normal.         The history was obtained from the review of the chart, patient.    Lab Results:   Lab Results   Component Value Date/Time    Free t4 0.9 10/03/2024 03:00 PM    Free t4 1.0 04/05/2024 02:45 PM     Component      Latest Ref Rng 10/3/2024   GLUCOSE      65 - 139 mg/dL 86    BUN      7 - 25 mg/dL 23    Creatinine      0.50 - 1.05 mg/dL 0.68    GFR, Calculated      > OR = 60 mL/min/1.73m2 99    SL AMB BUN/CREATININE RATIO      6 - 22 (calc) SEE NOTE:  " "  Sodium      135 - 146 mmol/L 141    Potassium      3.5 - 5.3 mmol/L 4.4    Chloride      98 - 110 mmol/L 107    Carbon Dioxide      20 - 32 mmol/L 28    Calcium      8.6 - 10.4 mg/dL 9.6    Total Protein      6.1 - 8.1 g/dL 6.4    Albumin      3.6 - 5.1 g/dL 4.2    Globulin      1.9 - 3.7 g/dL (calc) 2.2    Albumin/Globulin Ratio      1.0 - 2.5 (calc) 1.9    Total Bilirubin      0.2 - 1.2 mg/dL 0.4    ALK PHOS      37 - 153 U/L 67    AST      10 - 35 U/L 19    ALT      6 - 29 U/L 18    FREE T4      0.8 - 1.8 ng/dL 0.9    TSH, POC      0.40 - 4.50 mIU/L 2.25    EXTERNAL VITAMIN D,25      30 - 100 ng/mL 57          Imaging Studies:   Results for orders placed during the hospital encounter of 08/22/19    US thyroid    Impression  Diffusely heterogeneous and atrophic thyroid gland consistent with prior history of Hashimoto's thyroiditis.    On image #9 there was a right lower pole nodule measured, however, this is felt to be heterogeneous thyroid tissue as opposed to a true thyroid nodule.  Even if this was a true thyroid nodule it would not meet current ACR criteria requiring biopsy or  follow-up ultrasounds.        Reference: ACR Thyroid Imaging, Reporting and Data System (TI-RADS): White Paper of the ACR TI-RADS Committee. J AM Sek[employer] Radiol 2017;14:587-595. (additional recommendations based on American Thyroid Association 2015 guidelines.)    I, Lori Moore PA-C, am acting as a scribe while in the presence of the attending physician.    I, Dr. Zana Parks, personally reviewed and interpreted the study in this report as scribed in my presence.      Workstation performed: [id number]      Results Review Statement: I reviewed radiology reports from this admission including: Ultrasound(s).    Portions of the record may have been created with voice recognition software. Occasional wrong word or \"sound a like\" substitutions may have occurred due to the inherent limitations of voice recognition software. Read the chart " carefully and recognize, using context, where substitutions have occurred.

## 2024-10-11 NOTE — PATIENT INSTRUCTIONS
Continue same dose of levothyroxine at this time.     Call with any concerns or questions.     Follow up in 1 year with lab work prior to visit.